# Patient Record
Sex: FEMALE | Race: WHITE | HISPANIC OR LATINO
[De-identification: names, ages, dates, MRNs, and addresses within clinical notes are randomized per-mention and may not be internally consistent; named-entity substitution may affect disease eponyms.]

---

## 2018-07-30 ENCOUNTER — APPOINTMENT (OUTPATIENT)
Dept: FAMILY MEDICINE | Facility: CLINIC | Age: 18
End: 2018-07-30
Payer: COMMERCIAL

## 2018-07-30 ENCOUNTER — LABORATORY RESULT (OUTPATIENT)
Age: 18
End: 2018-07-30

## 2018-07-30 VITALS
BODY MASS INDEX: 26.62 KG/M2 | SYSTOLIC BLOOD PRESSURE: 115 MMHG | OXYGEN SATURATION: 98 % | DIASTOLIC BLOOD PRESSURE: 60 MMHG | HEIGHT: 61 IN | HEART RATE: 71 BPM | WEIGHT: 141 LBS

## 2018-07-30 DIAGNOSIS — Z87.19 PERSONAL HISTORY OF OTHER DISEASES OF THE DIGESTIVE SYSTEM: ICD-10-CM

## 2018-07-30 DIAGNOSIS — Z90.49 ACQUIRED ABSENCE OF OTHER SPECIFIED PARTS OF DIGESTIVE TRACT: ICD-10-CM

## 2018-07-30 PROCEDURE — 36415 COLL VENOUS BLD VENIPUNCTURE: CPT

## 2018-07-30 PROCEDURE — 99385 PREV VISIT NEW AGE 18-39: CPT | Mod: 25

## 2018-08-02 NOTE — REVIEW OF SYSTEMS
[Fatigue] : fatigue [Negative] : Neurological [Fever] : no fever [Chills] : no chills [Night Sweats] : no night sweats [Chest Pain] : no chest pain [Palpitations] : no palpitations [Shortness Of Breath] : no shortness of breath [Cough] : no cough [Abdominal Pain] : no abdominal pain [Nausea] : no nausea [Constipation] : no constipation [de-identified] : see HPI

## 2018-08-02 NOTE — HISTORY OF PRESENT ILLNESS
[FreeTextEntry1] : to establish care  [de-identified] : Ms. NERY ASHRAF presents today to establish care being referred to me by insurance \par She is an affable 18 year old female with PMH significant for depression and OCD hospitalized on 2 occasions for mental health related issues;  one in High school and one in first year of college ( OD on Robitussin) .  \par PSH significant for Appendectomy/ dental work. \par \par Denies any recent ER visits/hospitalizations/ MVA's or MSK injuries. 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

## 2018-08-02 NOTE — PHYSICAL EXAM
[No Acute Distress] : no acute distress [Normal Sclera/Conjunctiva] : normal sclera/conjunctiva [PERRL] : pupils equal round and reactive to light [Normal Oropharynx] : the oropharynx was normal [Normal TMs] : both tympanic membranes were normal [Supple] : supple [Thyroid Normal, No Nodules] : the thyroid was normal and there were no nodules present [No Respiratory Distress] : no respiratory distress  [Clear to Auscultation] : lungs were clear to auscultation bilaterally [Regular Rhythm] : with a regular rhythm [Pedal Pulses Present] : the pedal pulses are present [No Edema] : there was no peripheral edema [Soft] : abdomen soft [Non Tender] : non-tender [No Masses] : no abdominal mass palpated [No Spinal Tenderness] : no spinal tenderness [No Joint Swelling] : no joint swelling [Grossly Normal Strength/Tone] : grossly normal strength/tone [No Rash] : no rash [Normal Gait] : normal gait [No Focal Deficits] : no focal deficits [Normal Affect] : the affect was normal [Alert and Oriented x3] : oriented to person, place, and time [de-identified] : calm and articulate  [de-identified] : warm and dry

## 2018-08-02 NOTE — ASSESSMENT
[FreeTextEntry1] :  Well exam for 18  year old WF with PMH as stated in HPI / active list. \par \par Management : \par  Discussed strategies for caloric reduction and regular aerobic activity.\par \par Patient counseled on health maintenance and preventive measures with a focus on avoiding C/E/D.\par Safe sex practices reviewed.\par \par See HPI and Plan\par \par Labs in office today.\par \par Best wishes offered.\par

## 2018-08-03 LAB
ALBUMIN SERPL ELPH-MCNC: 4.8 G/DL
ALP BLD-CCNC: 60 U/L
ALT SERPL-CCNC: 9 U/L
ANION GAP SERPL CALC-SCNC: 18 MMOL/L
APPEARANCE: CLEAR
AST SERPL-CCNC: 16 U/L
BASOPHILS # BLD AUTO: 0.05 K/UL
BASOPHILS NFR BLD AUTO: 0.7 %
BILIRUB SERPL-MCNC: 1 MG/DL
BILIRUBIN URINE: NEGATIVE
BLOOD URINE: ABNORMAL
BUN SERPL-MCNC: 12 MG/DL
CALCIUM SERPL-MCNC: 9.8 MG/DL
CHLORIDE SERPL-SCNC: 98 MMOL/L
CO2 SERPL-SCNC: 21 MMOL/L
COLOR: YELLOW
CREAT SERPL-MCNC: 0.66 MG/DL
EOSINOPHIL # BLD AUTO: 0.22 K/UL
EOSINOPHIL NFR BLD AUTO: 2.9 %
GLUCOSE QUALITATIVE U: NEGATIVE MG/DL
GLUCOSE SERPL-MCNC: 81 MG/DL
HBA1C MFR BLD HPLC: 4.6 %
HCT VFR BLD CALC: 40.9 %
HGB BLD-MCNC: 13.9 G/DL
IMM GRANULOCYTES NFR BLD AUTO: 0 %
KETONES URINE: NEGATIVE
LEUKOCYTE ESTERASE URINE: NEGATIVE
LYMPHOCYTES # BLD AUTO: 2.54 K/UL
LYMPHOCYTES NFR BLD AUTO: 33.1 %
MAN DIFF?: NORMAL
MCHC RBC-ENTMCNC: 33 PG
MCHC RBC-ENTMCNC: 34 GM/DL
MCV RBC AUTO: 97.1 FL
MONOCYTES # BLD AUTO: 0.33 K/UL
MONOCYTES NFR BLD AUTO: 4.3 %
NEUTROPHILS # BLD AUTO: 4.53 K/UL
NEUTROPHILS NFR BLD AUTO: 59 %
NITRITE URINE: NEGATIVE
PH URINE: 8
PLATELET # BLD AUTO: 406 K/UL
POTASSIUM SERPL-SCNC: 4.4 MMOL/L
PROT SERPL-MCNC: 7.2 G/DL
PROTEIN URINE: NEGATIVE MG/DL
RBC # BLD: 4.21 M/UL
RBC # FLD: 13.7 %
SODIUM SERPL-SCNC: 137 MMOL/L
SPECIFIC GRAVITY URINE: 1.02
TSH SERPL-ACNC: 1.19 UIU/ML
UROBILINOGEN URINE: NEGATIVE MG/DL
WBC # FLD AUTO: 7.67 K/UL

## 2018-08-13 ENCOUNTER — APPOINTMENT (OUTPATIENT)
Dept: FAMILY MEDICINE | Facility: CLINIC | Age: 18
End: 2018-08-13
Payer: COMMERCIAL

## 2018-08-13 VITALS
BODY MASS INDEX: 26.43 KG/M2 | DIASTOLIC BLOOD PRESSURE: 68 MMHG | SYSTOLIC BLOOD PRESSURE: 114 MMHG | HEART RATE: 78 BPM | HEIGHT: 61 IN | WEIGHT: 140 LBS | OXYGEN SATURATION: 98 %

## 2018-08-13 PROCEDURE — 99214 OFFICE O/P EST MOD 30 MIN: CPT

## 2018-08-15 NOTE — REVIEW OF SYSTEMS
[Hearing Loss] : hearing loss [Negative] : Genitourinary [Fever] : no fever [Night Sweats] : no night sweats

## 2018-08-15 NOTE — HISTORY OF PRESENT ILLNESS
[FreeTextEntry1] : microscopic hematuria incidentally on labs in July .  Not at time of menses. \par Is menstruating today. \par Will provide lab slip and advised sample one week after menses and no GYM one day before.  [de-identified] : Last seen to establish care in July 2018 and encounter.  Reviewed.\par \par Today presents for removal of cerumen impaction B/L after one week of Debrox.\par Reports  ear fullness.

## 2018-08-15 NOTE — PHYSICAL EXAM
[No Acute Distress] : no acute distress [Normal Sclera/Conjunctiva] : normal sclera/conjunctiva [Supple] : supple [No Respiratory Distress] : no respiratory distress  [No Accessory Muscle Use] : no accessory muscle use [Normal Rate] : normal rate  [Normal S1, S2] : normal S1 and S2 [Alert and Oriented x3] : oriented to person, place, and time [de-identified] : calm and pleasant  [de-identified] : Cerumen impaction B/L

## 2018-09-04 LAB
APPEARANCE: CLEAR
BACTERIA: NEGATIVE
BILIRUBIN URINE: NEGATIVE
BLOOD URINE: ABNORMAL
COLOR: YELLOW
GLUCOSE QUALITATIVE U: NEGATIVE MG/DL
KETONES URINE: NEGATIVE
LEUKOCYTE ESTERASE URINE: NEGATIVE
MICROSCOPIC-UA: NORMAL
NITRITE URINE: NEGATIVE
PH URINE: 8.5
PROTEIN URINE: NEGATIVE MG/DL
RED BLOOD CELLS URINE: 9 /HPF
SPECIFIC GRAVITY URINE: 1.02
SQUAMOUS EPITHELIAL CELLS: 2 /HPF
UROBILINOGEN URINE: NEGATIVE MG/DL
WHITE BLOOD CELLS URINE: 1 /HPF

## 2019-07-08 ENCOUNTER — APPOINTMENT (OUTPATIENT)
Dept: FAMILY MEDICINE | Facility: CLINIC | Age: 19
End: 2019-07-08

## 2019-07-16 ENCOUNTER — APPOINTMENT (OUTPATIENT)
Dept: FAMILY MEDICINE | Facility: CLINIC | Age: 19
End: 2019-07-16

## 2019-07-16 ENCOUNTER — APPOINTMENT (OUTPATIENT)
Dept: FAMILY MEDICINE | Facility: CLINIC | Age: 19
End: 2019-07-16
Payer: COMMERCIAL

## 2019-07-16 VITALS
HEART RATE: 113 BPM | WEIGHT: 122 LBS | BODY MASS INDEX: 23.03 KG/M2 | HEIGHT: 61 IN | DIASTOLIC BLOOD PRESSURE: 60 MMHG | SYSTOLIC BLOOD PRESSURE: 100 MMHG | TEMPERATURE: 98.6 F | RESPIRATION RATE: 16 BRPM | OXYGEN SATURATION: 98 %

## 2019-07-16 DIAGNOSIS — Z23 ENCOUNTER FOR IMMUNIZATION: ICD-10-CM

## 2019-07-16 DIAGNOSIS — F51.02 ADJUSTMENT INSOMNIA: ICD-10-CM

## 2019-07-16 DIAGNOSIS — R31.29 OTHER MICROSCOPIC HEMATURIA: ICD-10-CM

## 2019-07-16 DIAGNOSIS — H61.23 IMPACTED CERUMEN, BILATERAL: ICD-10-CM

## 2019-07-16 PROCEDURE — 99214 OFFICE O/P EST MOD 30 MIN: CPT

## 2019-07-16 RX ORDER — PROPRANOLOL HYDROCHLORIDE 10 MG/1
10 TABLET ORAL
Refills: 0 | Status: DISCONTINUED | COMMUNITY
End: 2019-07-16

## 2019-07-21 PROBLEM — H61.23 BILATERAL IMPACTED CERUMEN: Status: RESOLVED | Noted: 2018-08-13 | Resolved: 2019-07-21

## 2019-07-21 PROBLEM — F51.02 TRANSIENT INSOMNIA: Status: ACTIVE | Noted: 2019-07-21

## 2019-07-21 PROBLEM — R31.29 MICROSCOPIC HEMATURIA: Status: RESOLVED | Noted: 2018-08-13 | Resolved: 2019-07-21

## 2019-07-21 NOTE — ASSESSMENT
[FreeTextEntry1] : acute on chronic anxiety    advised urgent contact with Psychiatrist who is not available today.\par much support rendered .\par In consultation with mother ( who was in waiting room) agrees to SHORT TERM use of Xanax to help with sleep as well and acute emotion.  \par Call  office tomorrow.\par i will be available for consult  with Psychiatrist .

## 2019-07-21 NOTE — PHYSICAL EXAM
[No Respiratory Distress] : no respiratory distress  [No Accessory Muscle Use] : no accessory muscle use [Normal Rate] : normal rate  [Regular Rhythm] : with a regular rhythm [No Joint Swelling] : no joint swelling [No Focal Deficits] : no focal deficits [de-identified] : tearful  pam

## 2019-07-21 NOTE — HEALTH RISK ASSESSMENT
[Yes] : Yes [2 - 3 times a week (3 pts)] : 2 - 3  times a week (3 points) [1 or 2 (0 pts)] : 1 or 2 (0 points) [Never (0 pts)] : Never (0 points) [3] : 2) Feeling down, depressed, or hopeless for nearly every day (3) [] : No [Audit-CScore] : 3

## 2019-07-21 NOTE — HISTORY OF PRESENT ILLNESS
[FreeTextEntry8] : Pt c/o not being able to sleep 3 weeks. \par Pt c/o of Anxiety for the past week \par [facility] \par \par Last seen August 2018 and encounter reviewed.\par Since has been well ; attends college and doing well; Regular FU with Psych for chronic anxiety and depression. \par Noticeable weight loss of  18 lbs. She states "diet". \par \par States she is unable to sleep for "days" and is concerned  for "everything.".

## 2019-07-29 ENCOUNTER — APPOINTMENT (OUTPATIENT)
Dept: FAMILY MEDICINE | Facility: CLINIC | Age: 19
End: 2019-07-29

## 2019-08-22 ENCOUNTER — APPOINTMENT (OUTPATIENT)
Dept: FAMILY MEDICINE | Facility: CLINIC | Age: 19
End: 2019-08-22

## 2020-06-30 ENCOUNTER — APPOINTMENT (OUTPATIENT)
Dept: FAMILY MEDICINE | Facility: CLINIC | Age: 20
End: 2020-06-30

## 2020-07-02 ENCOUNTER — APPOINTMENT (OUTPATIENT)
Dept: DERMATOLOGY | Facility: CLINIC | Age: 20
End: 2020-07-02
Payer: COMMERCIAL

## 2020-07-02 PROCEDURE — 99202 OFFICE O/P NEW SF 15 MIN: CPT

## 2020-10-08 ENCOUNTER — APPOINTMENT (OUTPATIENT)
Dept: FAMILY MEDICINE | Facility: CLINIC | Age: 20
End: 2020-10-08

## 2021-01-14 ENCOUNTER — EMERGENCY (EMERGENCY)
Facility: HOSPITAL | Age: 21
LOS: 1 days | Discharge: HOME | End: 2021-01-14
Attending: EMERGENCY MEDICINE
Payer: COMMERCIAL

## 2021-01-14 VITALS
DIASTOLIC BLOOD PRESSURE: 74 MMHG | HEART RATE: 66 BPM | OXYGEN SATURATION: 99 % | TEMPERATURE: 98 F | RESPIRATION RATE: 18 BRPM | SYSTOLIC BLOOD PRESSURE: 123 MMHG

## 2021-01-14 VITALS
RESPIRATION RATE: 18 BRPM | DIASTOLIC BLOOD PRESSURE: 73 MMHG | SYSTOLIC BLOOD PRESSURE: 114 MMHG | TEMPERATURE: 98 F | OXYGEN SATURATION: 98 % | HEART RATE: 87 BPM

## 2021-01-14 DIAGNOSIS — F60.3 BORDERLINE PERSONALITY DISORDER: ICD-10-CM

## 2021-01-14 DIAGNOSIS — Z98.89 OTHER SPECIFIED POSTPROCEDURAL STATES: Chronic | ICD-10-CM

## 2021-01-14 DIAGNOSIS — F31.81 BIPOLAR II DISORDER: ICD-10-CM

## 2021-01-14 LAB
ALBUMIN SERPL ELPH-MCNC: 5 G/DL — SIGNIFICANT CHANGE UP (ref 3.3–5.2)
ALP SERPL-CCNC: 61 U/L — SIGNIFICANT CHANGE UP (ref 40–120)
ALT FLD-CCNC: 15 U/L — SIGNIFICANT CHANGE UP
AMPHET UR-MCNC: NEGATIVE — SIGNIFICANT CHANGE UP
ANION GAP SERPL CALC-SCNC: 10 MMOL/L — SIGNIFICANT CHANGE UP (ref 5–17)
APAP SERPL-MCNC: <3 UG/ML — LOW (ref 10–26)
AST SERPL-CCNC: 17 U/L — SIGNIFICANT CHANGE UP
BARBITURATES UR SCN-MCNC: NEGATIVE — SIGNIFICANT CHANGE UP
BENZODIAZ UR-MCNC: NEGATIVE — SIGNIFICANT CHANGE UP
BILIRUB SERPL-MCNC: 0.7 MG/DL — SIGNIFICANT CHANGE UP (ref 0.4–2)
BUN SERPL-MCNC: 12 MG/DL — SIGNIFICANT CHANGE UP (ref 8–20)
CALCIUM SERPL-MCNC: 10 MG/DL — SIGNIFICANT CHANGE UP (ref 8.6–10.2)
CHLORIDE SERPL-SCNC: 100 MMOL/L — SIGNIFICANT CHANGE UP (ref 98–107)
CO2 SERPL-SCNC: 28 MMOL/L — SIGNIFICANT CHANGE UP (ref 22–29)
COCAINE METAB.OTHER UR-MCNC: NEGATIVE — SIGNIFICANT CHANGE UP
CREAT SERPL-MCNC: 0.53 MG/DL — SIGNIFICANT CHANGE UP (ref 0.5–1.3)
ETHANOL SERPL-MCNC: <10 MG/DL — HIGH (ref 0–9)
GLUCOSE SERPL-MCNC: 113 MG/DL — HIGH (ref 70–99)
HCG UR QL: NEGATIVE — SIGNIFICANT CHANGE UP
HCT VFR BLD CALC: 42.6 % — SIGNIFICANT CHANGE UP (ref 34.5–45)
HGB BLD-MCNC: 15 G/DL — SIGNIFICANT CHANGE UP (ref 11.5–15.5)
MCHC RBC-ENTMCNC: 33.4 PG — SIGNIFICANT CHANGE UP (ref 27–34)
MCHC RBC-ENTMCNC: 35.2 GM/DL — SIGNIFICANT CHANGE UP (ref 32–36)
MCV RBC AUTO: 94.9 FL — SIGNIFICANT CHANGE UP (ref 80–100)
METHADONE UR-MCNC: NEGATIVE — SIGNIFICANT CHANGE UP
OPIATES UR-MCNC: NEGATIVE — SIGNIFICANT CHANGE UP
PCP SPEC-MCNC: SIGNIFICANT CHANGE UP
PCP UR-MCNC: NEGATIVE — SIGNIFICANT CHANGE UP
PLATELET # BLD AUTO: 421 K/UL — HIGH (ref 150–400)
POTASSIUM SERPL-MCNC: 3.4 MMOL/L — LOW (ref 3.5–5.3)
POTASSIUM SERPL-SCNC: 3.4 MMOL/L — LOW (ref 3.5–5.3)
PROT SERPL-MCNC: 8.1 G/DL — SIGNIFICANT CHANGE UP (ref 6.6–8.7)
RBC # BLD: 4.49 M/UL — SIGNIFICANT CHANGE UP (ref 3.8–5.2)
RBC # FLD: 11.7 % — SIGNIFICANT CHANGE UP (ref 10.3–14.5)
SALICYLATES SERPL-MCNC: <0.6 MG/DL — LOW (ref 10–20)
SARS-COV-2 RNA SPEC QL NAA+PROBE: SIGNIFICANT CHANGE UP
SODIUM SERPL-SCNC: 138 MMOL/L — SIGNIFICANT CHANGE UP (ref 135–145)
THC UR QL: NEGATIVE — SIGNIFICANT CHANGE UP
WBC # BLD: 10.87 K/UL — HIGH (ref 3.8–10.5)
WBC # FLD AUTO: 10.87 K/UL — HIGH (ref 3.8–10.5)

## 2021-01-14 PROCEDURE — 99285 EMERGENCY DEPT VISIT HI MDM: CPT

## 2021-01-14 PROCEDURE — 36415 COLL VENOUS BLD VENIPUNCTURE: CPT

## 2021-01-14 PROCEDURE — 80307 DRUG TEST PRSMV CHEM ANLYZR: CPT

## 2021-01-14 PROCEDURE — 90792 PSYCH DIAG EVAL W/MED SRVCS: CPT

## 2021-01-14 PROCEDURE — 81025 URINE PREGNANCY TEST: CPT

## 2021-01-14 PROCEDURE — U0003: CPT

## 2021-01-14 PROCEDURE — 85027 COMPLETE CBC AUTOMATED: CPT

## 2021-01-14 PROCEDURE — U0005: CPT

## 2021-01-14 PROCEDURE — 80053 COMPREHEN METABOLIC PANEL: CPT

## 2021-01-14 PROCEDURE — 93010 ELECTROCARDIOGRAM REPORT: CPT

## 2021-01-14 PROCEDURE — 93005 ELECTROCARDIOGRAM TRACING: CPT

## 2021-01-14 PROCEDURE — 99283 EMERGENCY DEPT VISIT LOW MDM: CPT

## 2021-01-14 RX ORDER — VENLAFAXINE HCL 75 MG
300 CAPSULE, EXT RELEASE 24 HR ORAL ONCE
Refills: 0 | Status: COMPLETED | OUTPATIENT
Start: 2021-01-14 | End: 2021-01-14

## 2021-01-14 RX ORDER — BENZTROPINE MESYLATE 1 MG
1 TABLET ORAL
Qty: 0 | Refills: 0 | DISCHARGE

## 2021-01-14 RX ORDER — ARIPIPRAZOLE 15 MG/1
1 TABLET ORAL
Qty: 0 | Refills: 0 | DISCHARGE

## 2021-01-14 RX ORDER — VENLAFAXINE HCL 75 MG
300 CAPSULE, EXT RELEASE 24 HR ORAL ONCE
Refills: 0 | Status: DISCONTINUED | OUTPATIENT
Start: 2021-01-14 | End: 2021-01-14

## 2021-01-14 RX ORDER — VENLAFAXINE HCL 75 MG
300 CAPSULE, EXT RELEASE 24 HR ORAL
Qty: 0 | Refills: 0 | DISCHARGE

## 2021-01-14 RX ADMIN — Medication 300 MILLIGRAM(S): at 20:50

## 2021-01-14 NOTE — ED BEHAVIORAL HEALTH ASSESSMENT NOTE - HPI (INCLUDE ILLNESS QUALITY, SEVERITY, DURATION, TIMING, CONTEXT, MODIFYING FACTORS, ASSOCIATED SIGNS AND SYMPTOMS)
20 yos female, lives with family, college graduate, not working, PPH Bipolar 2 disorder, Borderline Personality disorder, ADHD, 2 prior psych admissions for SI and suicide attempt, one suicide attempt by hanging (rope untied) will psych admission at [facility] and another suicide gesture by trying to choke herself in 2013 with [facility] admission, h/o cutting most recent yesterday (2 small superficial abrasions to right thigh, no pmh bib mother for med refill.  Pt reports her Effexor was raised from 150 mg to 2 tabs of 150 mg but ran out and was unable to get in tough with provider until tonight when on her way to ED.  Pt c/o withdrawal from Effexor with headache and nausea.  Pt came to ED seeking assist and to get a dose as she has plan to follow up with current provider tomorrow who will rectify the issue.  Pt reports mood is OK, some minor depression since she felt distanced from a friend which upset her and resulted in her cutting (minor superficial abrasion).  Pt also asking for Strattera dose to be adjusted and was directed to out pt provider.  Denies endorses passive SI, denies plan or intent and denies need for psych admission or safety concerns.  Mo also denies safety concerns of need for psych admission.  Pt given 300 mg Effexor ER in ED prior to discharge.  No evidence of psychosis linda, or acute condition which requires psych admission.

## 2021-01-14 NOTE — ED BEHAVIORAL HEALTH ASSESSMENT NOTE - OTHER PAST PSYCHIATRIC HISTORY (INCLUDE DETAILS REGARDING ONSET, COURSE OF ILLNESS, INPATIENT/OUTPATIENT TREATMENT)
[facility] psych provider  [facility] 2019 s/p suicide attempt by hanging  [place] 2013 attempted self strangling

## 2021-01-14 NOTE — ED PROVIDER NOTE - ATTENDING CONTRIBUTION TO CARE
Pt. awake and alert. Pt. feeling depressed. Pt. was evaluated and cleared by psychiatry. I, Dr. Salinas, performed a face to face bedside interview with this patient regarding history of present illness, review of symptoms and relevant past medical, social and family history.  I completed an independent physical examination.  I have also reviewed the ACP's note(s) and discussed the plan with the ACP.

## 2021-01-14 NOTE — ED BEHAVIORAL HEALTH ASSESSMENT NOTE - SUMMARY
20 yos female, lives with family, college graduate, not working, PPH Bipolar 2 disorder, Borderline Personality disorder, ADHD, 2 prior psych admissions for SI and suicide attempt, one suicide attempt by hanging (rope untied) will psych admission at [facility] and another suicide gesture by trying to choke herself in 2013 with [facility] admission, h/o cutting most recent yesterday (2 small superficial abrasions to right thigh, no pmh bib mother for med refill.  Pt reports her Effexor was raised from 150 mg to 2 tabs of 150 mg but ran out and was unable to get in tough with provider until tonight when on her way to ED.  Pt c/o withdrawal from Effexor with headache and nausea.  Pt came to ED seeking assist and to get a dose as she has plan to follow up with current provider tomorrow who will rectify the issue.  Pt reports mood is OK, some minor depression since she felt distanced from a friend which upset her and resulted in her cutting (minor superficial abrasion).  Pt also asking for Strattera dose to be adjusted and was directed to out pt provider.  Denies endorses passive SI, denies plan or intent and denies need for psych admission or safety concerns.  Mo also denies safety concerns of need for psych admission.  Pt given 300 mg Effexor ER in ED prior to discharge.  No evidence of psychosis linda, or acute condition which requires psych admission.  Pt is not an imminent danger to self of others and is cleared psychiatrically for discharge.  Follow up tomorrow with out pt provider.

## 2021-01-14 NOTE — ED BEHAVIORAL HEALTH ASSESSMENT NOTE - DESCRIPTION
none college grad lives with family ICU Vital Signs Last 24 Hrs  T(C): 36.7 (14 Jan 2021 17:45), Max: 36.7 (14 Jan 2021 17:45)  T(F): 98.1 (14 Jan 2021 17:45), Max: 98.1 (14 Jan 2021 17:45)  HR: 66 (14 Jan 2021 17:45) (66 - 66)  BP: 123/74 (14 Jan 2021 17:45) (123/74 - 123/74)  BP(mean): --  ABP: --  ABP(mean): --  RR: 18 (14 Jan 2021 17:45) (18 - 18)  SpO2: 99% (14 Jan 2021 17:45) (99% - 99%)

## 2021-01-14 NOTE — CHART NOTE - NSCHARTNOTEFT_GEN_A_CORE
SW Note: Per psych team, pt is T&R, already has outpt psych. provider in community. SW provided pt with DASH flyer, no further SW services noted for d/c

## 2021-01-14 NOTE — ED PROVIDER NOTE - PATIENT PORTAL LINK FT
You can access the FollowMyHealth Patient Portal offered by [facility] by registering at the following website: [email or web address]. By joining [facility]’s FollowMyHealth portal, you will also be able to view your health information using other applications (apps) compatible with our system.

## 2021-01-14 NOTE — ED BEHAVIORAL HEALTH ASSESSMENT NOTE - RISK ASSESSMENT
RF past suicide attempt and SIB  PF denies SI, no safety concerns with Pt and mother, in tx Low Acute Suicide Risk

## 2021-01-14 NOTE — ED PROVIDER NOTE - OBJECTIVE STATEMENT
19 y/o F with PMH adhd and depression.  She states that she ran out of her effexor 3-4 days ago and can't get in touch with her psychiatrist.  She also feels that her strattera dose is too high.  She states she is having "little" thoughts of self harm.

## 2021-01-14 NOTE — ED BEHAVIORAL HEALTH ASSESSMENT NOTE - NSSUICPROTFACT_PSY_ALL_CORE
denies active SI/Responsibility to children, family, or others/Identifies reasons for living/Supportive social network of family or friends

## 2021-01-14 NOTE — ED ADULT NURSE NOTE - OBJECTIVE STATEMENT
Patient come to ED with her mother concerned about withdrawal form not have taken Effexor 300mg PO in last 4 days because her doctor did not renew it.  Patient reports feeling nausea and dizzy.  Patient admits she had a relapse of self injurious behavior and cut her self yesterday has two small superficial cuts on right flank.  Denies suicidal or homicidal ideations.  Denies any psychotic symptoms.  Seeking Effexor dose till she can get refill from her doctor.  Cooperative and calm.

## 2021-02-04 PROBLEM — F32.9 MAJOR DEPRESSIVE DISORDER, SINGLE EPISODE, UNSPECIFIED: Chronic | Status: ACTIVE | Noted: 2021-01-14

## 2021-02-04 PROBLEM — F90.9 ATTENTION-DEFICIT HYPERACTIVITY DISORDER, UNSPECIFIED TYPE: Chronic | Status: ACTIVE | Noted: 2021-01-14

## 2021-05-03 ENCOUNTER — LABORATORY RESULT (OUTPATIENT)
Age: 21
End: 2021-05-03

## 2021-05-03 ENCOUNTER — APPOINTMENT (OUTPATIENT)
Dept: FAMILY MEDICINE | Facility: CLINIC | Age: 21
End: 2021-05-03
Payer: COMMERCIAL

## 2021-05-03 ENCOUNTER — NON-APPOINTMENT (OUTPATIENT)
Age: 21
End: 2021-05-03

## 2021-05-03 VITALS
DIASTOLIC BLOOD PRESSURE: 74 MMHG | SYSTOLIC BLOOD PRESSURE: 122 MMHG | TEMPERATURE: 98.1 F | HEART RATE: 98 BPM | WEIGHT: 147 LBS | HEIGHT: 61 IN | BODY MASS INDEX: 27.75 KG/M2

## 2021-05-03 VITALS — DIASTOLIC BLOOD PRESSURE: 64 MMHG | SYSTOLIC BLOOD PRESSURE: 110 MMHG

## 2021-05-03 DIAGNOSIS — F41.9 ANXIETY DISORDER, UNSPECIFIED: ICD-10-CM

## 2021-05-03 PROBLEM — Z23 ENCOUNTER FOR IMMUNIZATION: Status: ACTIVE | Noted: 2021-05-03

## 2021-05-03 PROCEDURE — 93000 ELECTROCARDIOGRAM COMPLETE: CPT

## 2021-05-03 PROCEDURE — 99072 ADDL SUPL MATRL&STAF TM PHE: CPT

## 2021-05-03 PROCEDURE — 36415 COLL VENOUS BLD VENIPUNCTURE: CPT

## 2021-05-03 PROCEDURE — 99395 PREV VISIT EST AGE 18-39: CPT | Mod: 25

## 2021-05-03 RX ORDER — ATOMOXETINE 40 MG/1
40 CAPSULE ORAL
Qty: 30 | Refills: 0 | Status: COMPLETED | COMMUNITY
Start: 2021-01-05

## 2021-05-03 RX ORDER — ARIPIPRAZOLE 5 MG/1
5 TABLET ORAL
Qty: 30 | Refills: 0 | Status: COMPLETED | COMMUNITY
Start: 2020-12-01

## 2021-05-03 RX ORDER — BENZTROPINE MESYLATE 0.5 MG/1
0.5 TABLET ORAL
Qty: 30 | Refills: 0 | Status: COMPLETED | COMMUNITY
Start: 2020-12-01

## 2021-05-03 RX ORDER — LITHIUM CARBONATE 150 MG/1
150 CAPSULE ORAL
Qty: 30 | Refills: 0 | Status: ACTIVE | COMMUNITY
Start: 2021-04-22

## 2021-05-06 PROBLEM — F41.9 ACUTE ANXIETY: Status: RESOLVED | Noted: 2019-07-16 | Resolved: 2021-05-06

## 2021-05-06 NOTE — HEALTH RISK ASSESSMENT
[1] : 2) Feeling down, depressed, or hopeless for several days (1) [Good] : ~his/her~  mood as  good [No] : No [No falls in past year] : Patient reported no falls in the past year [] : No [de-identified] : PSYCH     [[id number]] : 2 [de-identified] : as in hpi  [Sexually Active] : not sexually active

## 2021-05-06 NOTE — ADDENDUM
[FreeTextEntry1] : I, [place] Quintanilla acting as a scribe for Dr. Nadia Pereira MD on 05/03/2021 at 4:05 PM.\par

## 2021-05-06 NOTE — END OF VISIT
[FreeTextEntry3] : Medical record entries made by the scribe today, were at my direction and personally dictated to them by me, Dr. Nadia Pereira on 05/03/2021. I have reviewed the chart and agree that the record accurately reflects my personal performance of the history, physical exam, assessment and plan.\par

## 2021-05-06 NOTE — HISTORY OF PRESENT ILLNESS
[FreeTextEntry1] : CPE\par Last seen for CPE in August 2018\par Last seen in office for acute visit JULY 2019 > Reviewed. \par Does follow with PSYCH  [de-identified] : Jennifer is a 21 y/o F here for CPE. \par She was admitted to the ER in January 2021 due to Effexor medication withdrawal.\par Medication list reconciled. \par  Her new psychiatrist is Dr. Cody [facility]tran at [facility]. \par She reports menstrual cycle being short, 2 or 3 days long, but claims they occur regularly. \par \par She exercises 5x a week using You-[employer] videos. She has a part time job working at [employer].\par \par In review, 2018:\par Ms. JENNIFER ASHRAF presents today to establish care being referred to me by insurance \par She is an affable 18 year old female with PMH significant for depression and OCD hospitalized on 2 occasions for mental health related issues;  one in High school and one in first year of college ( OD on Robitussin) .  \par PSH significant for Appendectomy/ dental work. \par \par Denies any recent ER visits/hospitalizations/ MVA's or MSK injuries. 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

## 2021-05-06 NOTE — PLAN
[FreeTextEntry1] : CPE for 20 year old F with PMH as stated in HPI / active list. \par \par Management : \par \par See HPI and Plan\par \par Labs in office today. Will advise.\par \par Advised to see ENT in 1 week for earwax.  Debrox drops one week prior as directed. \par \par the value and MIS  of daily walk, 30 minutes 5 x's per week,  stressed with regard  to overall health, mental health and bone density.\par \par Best wishes offered!\par

## 2021-05-11 LAB
ALBUMIN SERPL ELPH-MCNC: 4.9 G/DL
ALP BLD-CCNC: 81 U/L
ALT SERPL-CCNC: 17 U/L
ANION GAP SERPL CALC-SCNC: 12 MMOL/L
APPEARANCE: CLEAR
AST SERPL-CCNC: 18 U/L
BASOPHILS # BLD AUTO: 0.08 K/UL
BASOPHILS NFR BLD AUTO: 0.8 %
BILIRUB SERPL-MCNC: 0.4 MG/DL
BILIRUBIN URINE: NEGATIVE
BLOOD URINE: ABNORMAL
BUN SERPL-MCNC: 11 MG/DL
CALCIUM SERPL-MCNC: 9.8 MG/DL
CHLORIDE SERPL-SCNC: 101 MMOL/L
CHOLEST SERPL-MCNC: 141 MG/DL
CO2 SERPL-SCNC: 26 MMOL/L
COLOR: NORMAL
CREAT SERPL-MCNC: 0.8 MG/DL
EOSINOPHIL # BLD AUTO: 0.27 K/UL
EOSINOPHIL NFR BLD AUTO: 2.8 %
ESTIMATED AVERAGE GLUCOSE: 85 MG/DL
GLUCOSE QUALITATIVE U: NEGATIVE
GLUCOSE SERPL-MCNC: 77 MG/DL
HBA1C MFR BLD HPLC: 4.6 %
HCT VFR BLD CALC: 40.6 %
HDLC SERPL-MCNC: 65 MG/DL
HGB BLD-MCNC: 14.2 G/DL
IMM GRANULOCYTES NFR BLD AUTO: 0.2 %
KETONES URINE: NEGATIVE
LDLC SERPL CALC-MCNC: 66 MG/DL
LEUKOCYTE ESTERASE URINE: ABNORMAL
LYMPHOCYTES # BLD AUTO: 2.82 K/UL
LYMPHOCYTES NFR BLD AUTO: 29.1 %
MAN DIFF?: NORMAL
MCHC RBC-ENTMCNC: 33 PG
MCHC RBC-ENTMCNC: 35 GM/DL
MCV RBC AUTO: 94.4 FL
MONOCYTES # BLD AUTO: 0.79 K/UL
MONOCYTES NFR BLD AUTO: 8.1 %
NEUTROPHILS # BLD AUTO: 5.72 K/UL
NEUTROPHILS NFR BLD AUTO: 59 %
NITRITE URINE: NEGATIVE
NONHDLC SERPL-MCNC: 76 MG/DL
PH URINE: 6.5
PLATELET # BLD AUTO: 422 K/UL
POTASSIUM SERPL-SCNC: 4.5 MMOL/L
PROT SERPL-MCNC: 7.5 G/DL
PROTEIN URINE: NEGATIVE
RBC # BLD: 4.3 M/UL
RBC # FLD: 12.3 %
SODIUM SERPL-SCNC: 138 MMOL/L
SPECIFIC GRAVITY URINE: 1.01
TRIGL SERPL-MCNC: 47 MG/DL
TSH SERPL-ACNC: 2.73 UIU/ML
UROBILINOGEN URINE: NORMAL
WBC # FLD AUTO: 9.7 K/UL

## 2021-09-16 ENCOUNTER — APPOINTMENT (OUTPATIENT)
Dept: FAMILY MEDICINE | Facility: CLINIC | Age: 21
End: 2021-09-16
Payer: COMMERCIAL

## 2021-09-16 ENCOUNTER — TRANSCRIPTION ENCOUNTER (OUTPATIENT)
Age: 21
End: 2021-09-16

## 2021-09-16 DIAGNOSIS — L70.9 ACNE, UNSPECIFIED: ICD-10-CM

## 2021-09-16 DIAGNOSIS — F90.9 ATTENTION-DEFICIT HYPERACTIVITY DISORDER, UNSPECIFIED TYPE: ICD-10-CM

## 2021-09-16 DIAGNOSIS — K59.00 CONSTIPATION, UNSPECIFIED: ICD-10-CM

## 2021-09-16 PROCEDURE — 99214 OFFICE O/P EST MOD 30 MIN: CPT

## 2021-09-16 RX ORDER — FLUOXETINE HYDROCHLORIDE 40 MG/1
40 CAPSULE ORAL
Refills: 0 | Status: DISCONTINUED | COMMUNITY
End: 2021-09-16

## 2021-09-16 RX ORDER — ALPRAZOLAM 0.25 MG/1
0.25 TABLET ORAL 3 TIMES DAILY
Qty: 30 | Refills: 0 | Status: DISCONTINUED | COMMUNITY
Start: 2019-07-16 | End: 2021-09-16

## 2021-09-16 RX ORDER — METHYLPHENIDATE HYDROCHLORIDE 20 MG/1
20 TABLET ORAL
Qty: 60 | Refills: 0 | Status: DISCONTINUED | COMMUNITY
Start: 2021-03-25 | End: 2021-09-16

## 2021-09-16 RX ORDER — LAMOTRIGINE 100 MG/1
100 TABLET ORAL
Refills: 0 | Status: DISCONTINUED | COMMUNITY
End: 2021-09-16

## 2021-09-16 RX ORDER — METHYLPHENIDATE HYDROCHLORIDE 5 MG/1
5 TABLET ORAL
Qty: 30 | Refills: 0 | Status: DISCONTINUED | COMMUNITY
Start: 2021-02-25 | End: 2021-09-16

## 2021-09-22 NOTE — HISTORY OF PRESENT ILLNESS
[FreeTextEntry8] : Pt complains of abdominal pain and difficulty passing stool x 2 weeks\par Pt had wisdom tooth removal on 8/24/2021 and after finishing prescription ibuprofen 800 mg, patient was still experiencing pain and took a similar dosage of an NSAID. \par Since taking high dosage of NSAID patient has experienced abdominal pain, difficulty passing stool and bright blood in stool.\par Pt describes bowel movements as small and infrequent.\par \par \par PSYCH  Dr. [facility]\par

## 2021-09-22 NOTE — PHYSICAL EXAM
[No Acute Distress] : no acute distress [Normal Sclera/Conjunctiva] : normal sclera/conjunctiva [No JVD] : no jugular venous distention [No Respiratory Distress] : no respiratory distress  [No Accessory Muscle Use] : no accessory muscle use [Regular Rhythm] : with a regular rhythm [Normal S1, S2] : normal S1 and S2 [Soft] : abdomen soft [Non Tender] : non-tender [Non-distended] : non-distended [Normal Bowel Sounds] : normal bowel sounds [No Rash] : no rash [No Focal Deficits] : no focal deficits [Normal Gait] : normal gait [de-identified] : calm  [de-identified] : OMM MASK  [de-identified] : warm and dry  mild acne with some hypopigmentation of chin/jaw

## 2021-09-22 NOTE — ASSESSMENT
[FreeTextEntry1] : Bi Polar DO/ADHD  follow with psych  medications reconciled.\par \par Constipation   advised Colace and MiraLAX daily until soft complete BM then daily Colace for a while and QOD MiraLAX.\par \par Increase fiber and free water to 80 ounces daily.  \par \par acne topical refill provided.

## 2022-01-04 ENCOUNTER — TRANSCRIPTION ENCOUNTER (OUTPATIENT)
Age: 22
End: 2022-01-04

## 2022-01-12 ENCOUNTER — APPOINTMENT (OUTPATIENT)
Dept: FAMILY MEDICINE | Facility: CLINIC | Age: 22
End: 2022-01-12
Payer: COMMERCIAL

## 2022-01-12 ENCOUNTER — TRANSCRIPTION ENCOUNTER (OUTPATIENT)
Age: 22
End: 2022-01-12

## 2022-01-12 ENCOUNTER — NON-APPOINTMENT (OUTPATIENT)
Age: 22
End: 2022-01-12

## 2022-01-12 DIAGNOSIS — U07.1 COVID-19: ICD-10-CM

## 2022-01-12 PROCEDURE — 99214 OFFICE O/P EST MOD 30 MIN: CPT | Mod: 95

## 2022-01-18 NOTE — ASSESSMENT
[FreeTextEntry1] : NERY ASHRAF is a 21 year old female patient presenting to the clinic today for cough.\par \par #Reviewed Patients Symptoms \par - dry cough\par - trouble breathing out of nose \par \par #Covid-19\par - positive on 1/4

## 2022-01-18 NOTE — END OF VISIT
[FreeTextEntry3] : This note was written by Lori Sweeney on 01/12/2022, acting as a scribe for MD Usha.\par \par All medic record entries were at my, Dr.Meenu Amber MD, direction and personally dictated by me in 01/12/2022. I have personally reviewed the chart and agree that the record accurately reflects my personal performance of the history, physical exam, assessment, and plan.

## 2022-01-18 NOTE — HISTORY OF PRESENT ILLNESS
[Home] : at home, [unfilled] , at the time of the visit. [Medical Office: ([facility])___] : at the medical office located in  [Verbal consent obtained from patient] : the patient, [unfilled] [FreeTextEntry8] : NERY ASHRAF is a 21 year old female patient presenting to the clinic today for cough. Patient tested positive 1/4/22 for Covid.  She is still experiencing a dry cough and is unable to breath out of her nose . She starts coughing early in the morning, approximately 3 am.  \par \par She is concerned if she should go back to work since it has been 10 days since her symptom onset. She will need a note to take off work Friday 1/14.\par \par Start: 12:54 pm\par End: 1:02 pm

## 2022-01-18 NOTE — PLAN
[FreeTextEntry1] : # Start Rx prednisone 20 MG\par # Start Rx Azithromycin 250 MG; Take 2 tablets on first day then 1 tablet daily for four days \par # advised to take off work Friday 1/14 and return 1/18\par # advised to wear a mask when returning to work\par # advised to set up patient portal\par # advised to use Flonase spray with head positioned forward \par # f/u if symptoms worsen

## 2022-01-27 ENCOUNTER — APPOINTMENT (OUTPATIENT)
Dept: DERMATOLOGY | Facility: CLINIC | Age: 22
End: 2022-01-27
Payer: COMMERCIAL

## 2022-01-27 PROCEDURE — 99213 OFFICE O/P EST LOW 20 MIN: CPT

## 2022-03-24 ENCOUNTER — APPOINTMENT (OUTPATIENT)
Dept: DERMATOLOGY | Facility: CLINIC | Age: 22
End: 2022-03-24
Payer: COMMERCIAL

## 2022-03-24 PROCEDURE — 99213 OFFICE O/P EST LOW 20 MIN: CPT

## 2022-03-31 ENCOUNTER — APPOINTMENT (OUTPATIENT)
Dept: FAMILY MEDICINE | Facility: CLINIC | Age: 22
End: 2022-03-31
Payer: COMMERCIAL

## 2022-03-31 VITALS
DIASTOLIC BLOOD PRESSURE: 60 MMHG | SYSTOLIC BLOOD PRESSURE: 106 MMHG | BODY MASS INDEX: 27.38 KG/M2 | HEART RATE: 76 BPM | WEIGHT: 145 LBS | OXYGEN SATURATION: 98 % | HEIGHT: 61 IN

## 2022-03-31 DIAGNOSIS — M54.2 CERVICALGIA: ICD-10-CM

## 2022-03-31 PROCEDURE — 99214 OFFICE O/P EST MOD 30 MIN: CPT

## 2022-04-09 NOTE — PHYSICAL EXAM
[No Acute Distress] : no acute distress [Normal Sclera/Conjunctiva] : normal sclera/conjunctiva [No JVD] : no jugular venous distention [No Respiratory Distress] : no respiratory distress  [Normal Rate] : normal rate  [No Edema] : there was no peripheral edema [No Focal Deficits] : no focal deficits [Speech Grossly Normal] : speech grossly normal [Alert and Oriented x3] : oriented to person, place, and time [Supple] : supple [Thyroid Normal, No Nodules] : the thyroid was normal and there were no nodules present [de-identified] : calm and engaging  [de-identified] : FROM  winces on turning to right   no spasming   appreciated.

## 2022-04-09 NOTE — ASSESSMENT
[FreeTextEntry1] : Acute encounter for 21 year old WF with PMH as stated in HPI / active list. \par \par Management : \par \par See HPI and Plan.\par \par Prescription strength Ibuprofen 3x a day for 3 days, cyclobenzaprine in PM, follow up as needed. \par \par Gentle neck exercises for stretching advised.\par \par Ice alternating with heat for additional relief.\par \par +/- Imaging and PT if does not resolve \par \par Best wishes offered!

## 2022-04-09 NOTE — ADDENDUM
[FreeTextEntry1] : Medical record entries made by the scribe today, were at my direction and personally dictated to them by me, Dr. Nadia Pereira on 03/31/2022.  I have reviewed the chart and agree that the record accurately reflects my personal performance of the history, physical exam, assessment and plan.\par \par Brian HUTCHINSON acting as scribe for Dr. Nadia Pereira MD on 03/31/2022 at 4:16 PM.\par

## 2022-04-09 NOTE — HISTORY OF PRESENT ILLNESS
[FreeTextEntry8] : NERY ASHRAF is a 21 year old F who presents today for acute neck pain onset Saturday.  Pt states that pain in her neck occurred after going back to the gym, pain radiates to her back, "weird numbness on back sometimes."  \par \par Has self treated with Ibuprofen.  No numbness in upper extremities or hands.  \par \par Strained and uncomfortable.  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

## 2022-05-17 ENCOUNTER — APPOINTMENT (OUTPATIENT)
Dept: FAMILY MEDICINE | Facility: CLINIC | Age: 22
End: 2022-05-17
Payer: COMMERCIAL

## 2022-05-17 VITALS
WEIGHT: 140.06 LBS | OXYGEN SATURATION: 98 % | HEIGHT: 61 IN | RESPIRATION RATE: 14 BRPM | DIASTOLIC BLOOD PRESSURE: 70 MMHG | TEMPERATURE: 98.2 F | HEART RATE: 82 BPM | BODY MASS INDEX: 26.44 KG/M2 | SYSTOLIC BLOOD PRESSURE: 110 MMHG

## 2022-05-17 PROCEDURE — 99214 OFFICE O/P EST MOD 30 MIN: CPT

## 2022-05-17 RX ORDER — IBUPROFEN 600 MG/1
600 TABLET, FILM COATED ORAL 3 TIMES DAILY
Qty: 1 | Refills: 0 | Status: DISCONTINUED | COMMUNITY
Start: 2022-03-31 | End: 2022-05-17

## 2022-05-17 RX ORDER — VENLAFAXINE HYDROCHLORIDE 150 MG/1
150 CAPSULE, EXTENDED RELEASE ORAL
Qty: 30 | Refills: 0 | Status: DISCONTINUED | COMMUNITY
Start: 2020-12-01 | End: 2022-05-17

## 2022-05-17 RX ORDER — AZITHROMYCIN 250 MG/1
250 TABLET, FILM COATED ORAL
Qty: 6 | Refills: 0 | Status: DISCONTINUED | COMMUNITY
Start: 2022-01-12 | End: 2022-05-17

## 2022-05-17 RX ORDER — PREDNISONE 20 MG/1
20 TABLET ORAL DAILY
Qty: 6 | Refills: 0 | Status: DISCONTINUED | COMMUNITY
Start: 2022-01-12 | End: 2022-05-17

## 2022-05-21 ENCOUNTER — APPOINTMENT (OUTPATIENT)
Dept: RADIOLOGY | Facility: CLINIC | Age: 22
End: 2022-05-21
Payer: COMMERCIAL

## 2022-05-21 ENCOUNTER — OUTPATIENT (OUTPATIENT)
Dept: OUTPATIENT SERVICES | Facility: HOSPITAL | Age: 22
LOS: 1 days | End: 2022-05-21
Payer: COMMERCIAL

## 2022-05-21 DIAGNOSIS — M54.2 CERVICALGIA: ICD-10-CM

## 2022-05-21 DIAGNOSIS — Z98.89 OTHER SPECIFIED POSTPROCEDURAL STATES: Chronic | ICD-10-CM

## 2022-05-21 PROCEDURE — 72040 X-RAY EXAM NECK SPINE 2-3 VW: CPT | Mod: 26

## 2022-05-21 PROCEDURE — 72040 X-RAY EXAM NECK SPINE 2-3 VW: CPT

## 2022-05-21 NOTE — REVIEW OF SYSTEMS
[Fatigue] : fatigue [Headache] : headache [Negative] : Integumentary [de-identified] : "maybe a bit"

## 2022-05-21 NOTE — ASSESSMENT
[FreeTextEntry1] : At this time will offer muscle  relaxer bid  advised of sedation SE.\par Advised to goggle stretching exercises for lower back. When recovered should engage in core strengthening \par exercises. \par Would consider PT if not resolving.

## 2022-05-21 NOTE — PHYSICAL EXAM
[No Acute Distress] : no acute distress [Well-Appearing] : well-appearing [Normal Sclera/Conjunctiva] : normal sclera/conjunctiva [No Accessory Muscle Use] : no accessory muscle use [Clear to Auscultation] : lungs were clear to auscultation bilaterally [Regular Rhythm] : with a regular rhythm [Normal S1, S2] : normal S1 and S2 [No Rash] : no rash [Alert and Oriented x3] : oriented to person, place, and time [Normal Insight/Judgement] : insight and judgment were intact [de-identified] : calm and  engaging  [de-identified] : NINA [de-identified] : FROM    [de-identified] : Pain on flexion at 90  some spasming apprecited  [de-identified] : warm and dry

## 2022-05-21 NOTE — HISTORY OF PRESENT ILLNESS
[FreeTextEntry8] : NERY is being seen for  moderate , constant back pain that extends towards the shoulder and neck area for the past several week.\par  Pt has experienced numbness and tingling sensations in these areas. \par \par Seen for neck pain in March reportedly due to injury at gym. Improved "but still some pain". \par \par NERY states that her lower backs feels tight and sometimes wakes her from sleep. \par "NOthing helps "Endorses she tried ibuprofen and heat. \par "not sure " how it happened.\par NO ALARM FEATURES\par \par Follows with Behavioral [facility] vis telehealth; Dr Cody Beltran   last eval. 2 months ago  Meds stable. \par \par

## 2022-05-26 ENCOUNTER — APPOINTMENT (OUTPATIENT)
Dept: DERMATOLOGY | Facility: CLINIC | Age: 22
End: 2022-05-26
Payer: COMMERCIAL

## 2022-05-26 PROCEDURE — 99214 OFFICE O/P EST MOD 30 MIN: CPT

## 2022-06-13 ENCOUNTER — APPOINTMENT (OUTPATIENT)
Dept: ORTHOPEDIC SURGERY | Facility: CLINIC | Age: 22
End: 2022-06-13
Payer: COMMERCIAL

## 2022-06-13 VITALS — HEIGHT: 61 IN | WEIGHT: 136 LBS | BODY MASS INDEX: 25.68 KG/M2

## 2022-06-13 PROCEDURE — 99203 OFFICE O/P NEW LOW 30 MIN: CPT

## 2022-06-13 PROCEDURE — 72070 X-RAY EXAM THORAC SPINE 2VWS: CPT

## 2022-06-13 PROCEDURE — 72110 X-RAY EXAM L-2 SPINE 4/>VWS: CPT

## 2022-06-14 NOTE — ASSESSMENT
[FreeTextEntry1] : 23 y/o female patient with axial  lumbar pain.  Xrays normal.  I advised the patient to start home exercises to relieve pain. I advised the patient to take steroids in combination with home exercising. Education of the effects of steroids, with possible reaction of hyperactivity sensation.  Patient does have a hx for bipolar disorder and takes Lithium, so we've decided that the short steroid course would likely have a better side effect profile that NSAIDs with regard to nephrotoxicity.  PRN f/u.

## 2022-06-14 NOTE — HISTORY OF PRESENT ILLNESS
[Gradual] : gradual [7] : 7 [5] : 5 [Localized] : localized [Intermittent] : intermittent [Household chores] : household chores [Work] : work [Sleep] : sleep [Nothing helps with pain getting better] : Nothing helps with pain getting better [Sitting] : sitting [Lying in bed] : lying in bed [de-identified] : 23 y/o female with back pain. Pain sometimes travels upwards. Parasthenia in the back region. Has previously went to PCP for the pain. An X-Ray of cervical region was performed.  But the region of interest, thoracic, lumbar has not been evaluated.   Has taken cyclobenzaprine for back pain, but pain did not subside. Pain level varies without activity throughout the day.  No hx for radicular pains, paraesthesias, weakness, bowel or bladder complaints.  [] : no [FreeTextEntry4] : 1-2 months [FreeTextEntry5] : onset, no known injury.

## 2022-06-14 NOTE — REVIEW OF SYSTEMS
[Arthralgia] : arthralgia [Joint Pain] : joint pain [Joint Stiffness] : joint stiffness [Negative] : Heme/Lymph [FreeTextEntry9] : lumbar

## 2022-06-14 NOTE — PHYSICAL EXAM
[NL (90)] : forward flexion 90 degrees [NL (30)] : right lateral rotation 30 degrees [NL (45)] : extension 45 degrees [NL (40)] : right lateral bending 40 degrees [5___] : right plantor flexors 5[unfilled]/5 [] : non-antalgic

## 2022-06-28 ENCOUNTER — APPOINTMENT (OUTPATIENT)
Dept: ORTHOPEDIC SURGERY | Facility: CLINIC | Age: 22
End: 2022-06-28

## 2022-09-14 ENCOUNTER — APPOINTMENT (OUTPATIENT)
Dept: ORTHOPEDIC SURGERY | Facility: CLINIC | Age: 22
End: 2022-09-14

## 2022-09-14 VITALS — HEIGHT: 61 IN | WEIGHT: 136 LBS | BODY MASS INDEX: 25.68 KG/M2

## 2022-09-14 PROCEDURE — 99214 OFFICE O/P EST MOD 30 MIN: CPT

## 2022-09-14 PROCEDURE — 99072 ADDL SUPL MATRL&STAF TM PHE: CPT

## 2022-09-14 NOTE — ASSESSMENT
[FreeTextEntry1] : 21 y/o female with low back pain. PT was interrupted after 2 weeks bc of loss of insurance.  She continued to do the home exercises every day though.  Medrol pack was NOT useful at reducing pain.  Patient was provided with a referral for continued lumbar physical therapy to work on stretching, strengthening and range of motion. Patient was provided with a lumbar home exercise program. I am requesting a lumbar spine MRI to evaluate for disc herniation. I would like to follow up with the patient in 3 weeks to assess her response to conservative care and MRI review.\par \par Prior to appointment and during encounter with patient extensive medical records were reviewed including but not limited to, hospital records, out patient records, imaging results, and lab data. During this appointment the patient was examined, diagnoses were discussed and explained in a face to face manner. In addition extensive time was spent reviewing aforementioned diagnostic studies. Counseling including abnormal image results, differential diagnoses, treatment options, risk and benefits, lifestyle changes, current condition, and current medications was performed. Patient's comments, questions, and concerns were address and patient verbalized understanding. Based on this patient's presentation at our office, which is an orthopedic spine surgeon's office, this patient inherently / intrinsically has a risk, however minute, of developing issues such as Cauda equina syndrome, bowel and bladder changes, or progression of motor or neurological deficits such as paralysis which may be permanent.\par \par I, Enrique Hardin, attest that this documentation has been prepared under the direction and in the presence of provider Jalen Linares MD.

## 2022-09-14 NOTE — PHYSICAL EXAM
[NL (90)] : forward flexion 90 degrees [NL (30)] : right lateral rotation 30 degrees [NL (45)] : extension 45 degrees [NL (40)] : right lateral bending 40 degrees [5___] : right extensor hallicus longus 5[unfilled]/5 [de-identified] : Constitutional:\par -  General Appearance: \par Unremarkable\par Body Habitus\par Well Developed \par Well Nourished\par Body Habitus\par No Deformities\par Well Groomed\par \par Ability To communicate:\par Normal\par \par Neurologic: \par Global sensation is intact to upper and lower extremities.  See examination of Neck and/or Spine for exceptions.\par Orientation to Time, Place and Person is: Normal\par Mood And Affect is Normal\par \par Skin:\par -  Head/Face, Right Upper/Lower Extremity, Left Upper/Lower Extremity: Normal\par See Examination of Neck and/or Spine for exceptions\par \par Cardiovascular:\par Peripheral Cardiovascular System is Normal\par \par Palpation of Lymph Nodes:\par Normal Palpation of lymph nodes in: Axilla, Cervical, Inguinal\par Abnormal Palpation of lymph nodes in: None  [] : non-antalgic [de-identified] : extension 20 degrees Unstable angina

## 2022-09-14 NOTE — HISTORY OF PRESENT ILLNESS
[5] : 5 [6] : 6 [Part time] : Work status: part time [de-identified] : 21 y/o female presents for re-evaluation of lumbar pain. Patient reports that she was unable to continue PT as she found it cost prohibitive. Patient found PT helpful in reducing her pain. Patient reports that she gave her self a concussion after hitting her temple against the side of her night stand, she didn't seek treatment, again secondary to cost prohibition. Patient has continued the exercises we provided her in the absence of PT, reports that she completed MDP with no relief.  [FreeTextEntry5] : pain has gotten worse after banging head towards the ending of july on night stand [de-identified] : p/t (only did 2 weeks worth of therapy)

## 2022-09-15 ENCOUNTER — APPOINTMENT (OUTPATIENT)
Dept: DERMATOLOGY | Facility: CLINIC | Age: 22
End: 2022-09-15

## 2022-09-15 PROCEDURE — 99072 ADDL SUPL MATRL&STAF TM PHE: CPT

## 2022-09-15 PROCEDURE — 99213 OFFICE O/P EST LOW 20 MIN: CPT

## 2022-10-01 ENCOUNTER — APPOINTMENT (OUTPATIENT)
Dept: MRI IMAGING | Facility: CLINIC | Age: 22
End: 2022-10-01

## 2022-10-21 ENCOUNTER — FORM ENCOUNTER (OUTPATIENT)
Age: 22
End: 2022-10-21

## 2022-10-22 ENCOUNTER — APPOINTMENT (OUTPATIENT)
Dept: MRI IMAGING | Facility: CLINIC | Age: 22
End: 2022-10-22

## 2022-10-22 PROCEDURE — 99072 ADDL SUPL MATRL&STAF TM PHE: CPT

## 2022-10-22 PROCEDURE — 72148 MRI LUMBAR SPINE W/O DYE: CPT

## 2022-11-09 ENCOUNTER — APPOINTMENT (OUTPATIENT)
Dept: ORTHOPEDIC SURGERY | Facility: CLINIC | Age: 22
End: 2022-11-09

## 2022-11-09 PROCEDURE — 99214 OFFICE O/P EST MOD 30 MIN: CPT

## 2022-11-09 PROCEDURE — 99072 ADDL SUPL MATRL&STAF TM PHE: CPT

## 2022-11-14 NOTE — PHYSICAL EXAM
[NL (90)] : forward flexion 90 degrees [NL (30)] : right lateral rotation 30 degrees [NL (45)] : extension 45 degrees [NL (40)] : right lateral bending 40 degrees [Bending to left] : bending to left [5___] : right extensor hallicus longus 5[unfilled]/5 [de-identified] : Constitutional:\par -  General Appearance: \par Unremarkable\par Body Habitus\par Well Developed \par Well Nourished\par Body Habitus\par No Deformities\par Well Groomed\par \par Ability To communicate:\par Normal\par \par Neurologic: \par Global sensation is intact to upper and lower extremities.  See examination of Neck and/or Spine for exceptions.\par Orientation to Time, Place and Person is: Normal\par Mood And Affect is Normal\par \par Skin:\par -  Head/Face, Right Upper/Lower Extremity, Left Upper/Lower Extremity: Normal\par See Examination of Neck and/or Spine for exceptions\par \par Cardiovascular:\par Peripheral Cardiovascular System is Normal\par \par Palpation of Lymph Nodes:\par Normal Palpation of lymph nodes in: Axilla, Cervical, Inguinal\par Abnormal Palpation of lymph nodes in: None  [] : non-antalgic [de-identified] : extension 20 degrees

## 2022-11-14 NOTE — ASSESSMENT
[FreeTextEntry1] : 23 y/o female with mild disc degeneration. Patient will continue with PT and transition to HEP. Discussed that there is no need for injection therapy or surgical intervention at this time. Patient will follow up as needed. \par \par Prior to appointment and during encounter with patient extensive medical records were reviewed including but not limited to, hospital records, out patient records, imaging results, and lab data. During this appointment the patient was examined, diagnoses were discussed and explained in a face to face manner. In addition extensive time was spent reviewing aforementioned diagnostic studies. Counseling including abnormal image results, differential diagnoses, treatment options, risk and benefits, lifestyle changes, current condition, and current medications was performed. Patient's comments, questions, and concerns were address and patient verbalized understanding. Based on this patient's presentation at our office, which is an orthopedic spine surgeon's office, this patient inherently / intrinsically has a risk, however minute, of developing issues such as Cauda equina syndrome, bowel and bladder changes, or progression of motor or neurological deficits such as paralysis which may be permanent.\par \par I, Janessa Estrada, attest that this documentation has been prepared under the direction and in the presence of provider Jalen Linares MD.

## 2022-11-14 NOTE — HISTORY OF PRESENT ILLNESS
[5] : 5 [6] : 6 [Part time] : Work status: part time [de-identified] : 11/9/22: Patient states she is feeling slightly better since her last visit. She attended a few sessions of PT with some relief. She has been doing HEP as best tolerated. States she has radicular pain as well.\par \par Prev doc:\par 23 y/o female presents for re-evaluation of lumbar pain. Patient reports that she was unable to continue PT as she found it cost prohibitive. Patient found PT helpful in reducing her pain. Patient reports that she gave her self a concussion after hitting her temple against the side of her night stand, she didn't seek treatment, again secondary to cost prohibition. Patient has continued the exercises we provided her in the absence of PT, reports that she completed MDP with no relief.  [de-identified] : p/t (only did 2 weeks worth of therapy)

## 2022-11-14 NOTE — DATA REVIEWED
[MRI] : MRI [Lumbar Spine] : lumbar spine [Report was reviewed and noted in the chart] : The report was reviewed and noted in the chart [I independently reviewed and interpreted images and report] : I independently reviewed and interpreted images and report [I reviewed the films/CD and additionally noted] : I reviewed the films/CD and additionally noted [FreeTextEntry1] : 10/22/22 [facility] \par L4-5 mild small central HNP with no stenosis, no nerve compression

## 2022-11-28 ENCOUNTER — APPOINTMENT (OUTPATIENT)
Dept: FAMILY MEDICINE | Facility: CLINIC | Age: 22
End: 2022-11-28

## 2022-11-28 ENCOUNTER — LABORATORY RESULT (OUTPATIENT)
Age: 22
End: 2022-11-28

## 2022-11-28 ENCOUNTER — NON-APPOINTMENT (OUTPATIENT)
Age: 22
End: 2022-11-28

## 2022-11-28 VITALS
WEIGHT: 130 LBS | DIASTOLIC BLOOD PRESSURE: 70 MMHG | SYSTOLIC BLOOD PRESSURE: 122 MMHG | HEIGHT: 61 IN | OXYGEN SATURATION: 98 % | HEART RATE: 95 BPM | BODY MASS INDEX: 24.55 KG/M2

## 2022-11-28 DIAGNOSIS — F31.9 BIPOLAR DISORDER, UNSPECIFIED: ICD-10-CM

## 2022-11-28 DIAGNOSIS — F32.A DEPRESSION, UNSPECIFIED: ICD-10-CM

## 2022-11-28 DIAGNOSIS — Z00.00 ENCOUNTER FOR GENERAL ADULT MEDICAL EXAMINATION W/OUT ABNORMAL FINDINGS: ICD-10-CM

## 2022-11-28 PROCEDURE — 99072 ADDL SUPL MATRL&STAF TM PHE: CPT

## 2022-11-28 PROCEDURE — 99395 PREV VISIT EST AGE 18-39: CPT

## 2022-11-28 RX ORDER — GUANFACINE 1 MG/1
1 TABLET ORAL
Qty: 30 | Refills: 0 | Status: COMPLETED | COMMUNITY
Start: 2022-10-24

## 2022-11-28 RX ORDER — DAPSONE 50 MG/G
5 GEL TOPICAL
Qty: 60 | Refills: 0 | Status: COMPLETED | COMMUNITY
Start: 2022-05-26

## 2022-11-28 RX ORDER — DEXTROAMPHETAMINE SACCHARATE, AMPHETAMINE ASPARTATE, DEXTROAMPHETAMINE SULFATE AND AMPHETAMINE SULFATE 3.75; 3.75; 3.75; 3.75 MG/1; MG/1; MG/1; MG/1
15 TABLET ORAL
Qty: 60 | Refills: 0 | Status: COMPLETED | COMMUNITY
Start: 2022-08-08

## 2022-11-28 RX ORDER — DESVENLAFAXINE 25 MG/1
25 TABLET, EXTENDED RELEASE ORAL
Qty: 30 | Refills: 0 | Status: COMPLETED | COMMUNITY
Start: 2022-10-20

## 2022-11-28 RX ORDER — CYCLOBENZAPRINE HYDROCHLORIDE 5 MG/1
5 TABLET, FILM COATED ORAL
Qty: 30 | Refills: 0 | Status: DISCONTINUED | COMMUNITY
Start: 2022-03-31 | End: 2022-11-28

## 2022-11-28 RX ORDER — PRAZOSIN HYDROCHLORIDE 2 MG/1
2 CAPSULE ORAL
Refills: 0 | Status: DISCONTINUED | COMMUNITY
Start: 2021-09-16 | End: 2022-11-28

## 2022-11-28 RX ORDER — DEXTROAMPHETAMINE SACCHARATE, AMPHETAMINE ASPARTATE, DEXTROAMPHETAMINE SULFATE, AND AMPHETAMINE SULFATE 5; 5; 5; 5 MG/1; MG/1; MG/1; MG/1
20 TABLET ORAL
Qty: 30 | Refills: 0 | Status: DISCONTINUED | COMMUNITY
Start: 2021-09-16 | End: 2022-11-28

## 2022-11-28 RX ORDER — SPIRONOLACTONE 50 MG/1
50 TABLET ORAL
Qty: 60 | Refills: 0 | Status: ACTIVE | COMMUNITY
Start: 2022-07-12

## 2022-11-28 RX ORDER — LORATADINE 10 MG
17 TABLET,DISINTEGRATING ORAL DAILY
Qty: 1 | Refills: 2 | Status: DISCONTINUED | COMMUNITY
Start: 2021-09-16 | End: 2022-11-28

## 2022-11-28 RX ORDER — SODIUM SULFACETAMIDE 100 MG/ML
10 LOTION TOPICAL
Qty: 118 | Refills: 0 | Status: ACTIVE | COMMUNITY
Start: 2022-07-22

## 2022-11-28 RX ORDER — CLINDAMYCIN PHOSPHATE 10 MG/ML
1 LOTION TOPICAL TWICE DAILY
Qty: 1 | Refills: 1 | Status: DISCONTINUED | COMMUNITY
Start: 2020-07-02 | End: 2022-11-28

## 2022-11-28 RX ORDER — METHYLPREDNISOLONE 4 MG/1
4 TABLET ORAL
Qty: 1 | Refills: 0 | Status: DISCONTINUED | COMMUNITY
Start: 2022-06-13 | End: 2022-11-28

## 2022-11-28 RX ORDER — TRETINOIN 1 MG/G
0.1 CREAM TOPICAL
Qty: 45 | Refills: 0 | Status: ACTIVE | COMMUNITY
Start: 2022-01-27

## 2022-11-28 RX ORDER — VORTIOXETINE 10 MG/1
10 TABLET, FILM COATED ORAL
Qty: 30 | Refills: 0 | Status: COMPLETED | COMMUNITY
Start: 2022-06-30

## 2022-11-28 RX ORDER — DOCUSATE SODIUM 100 MG/1
100 CAPSULE ORAL TWICE DAILY
Qty: 60 | Refills: 0 | Status: DISCONTINUED | COMMUNITY
Start: 2021-09-16 | End: 2022-11-28

## 2022-11-28 RX ORDER — FLUTICASONE PROPIONATE 50 UG/1
50 SPRAY, METERED NASAL
Qty: 16 | Refills: 0 | Status: ACTIVE | COMMUNITY
Start: 2021-10-20

## 2022-11-28 NOTE — PLAN
[FreeTextEntry1] : \par In review May 2021:\par CPE for 20 year old F with PMH as stated in HPI / active list. \par \par Management : \par \par See HPI and Plan\par \par Labs in office today. Will advise.\par \par Advised to see ENT in 1 week for earwax.  Debrox drops one week prior as directed. \par \par the value and MIS  of daily walk, 30 minutes 5 x's per week,  stressed with regard  to overall health, mental health and bone density.\par \par Best wishes offered!\par

## 2022-12-05 LAB
ALBUMIN SERPL ELPH-MCNC: 5.1 G/DL
ALP BLD-CCNC: 55 U/L
ALT SERPL-CCNC: 10 U/L
ANION GAP SERPL CALC-SCNC: 12 MMOL/L
APPEARANCE: CLEAR
AST SERPL-CCNC: 15 U/L
BASOPHILS # BLD AUTO: 0.1 K/UL
BASOPHILS NFR BLD AUTO: 1 %
BILIRUB SERPL-MCNC: 0.6 MG/DL
BILIRUBIN URINE: NEGATIVE
BLOOD URINE: ABNORMAL
BUN SERPL-MCNC: 13 MG/DL
CALCIUM SERPL-MCNC: 10.1 MG/DL
CHLORIDE SERPL-SCNC: 98 MMOL/L
CO2 SERPL-SCNC: 25 MMOL/L
COLOR: NORMAL
CREAT SERPL-MCNC: 0.64 MG/DL
EGFR: 126 ML/MIN/1.73M2
EOSINOPHIL # BLD AUTO: 0.1 K/UL
EOSINOPHIL NFR BLD AUTO: 1 %
ESTIMATED AVERAGE GLUCOSE: 91 MG/DL
GLUCOSE QUALITATIVE U: NEGATIVE
GLUCOSE SERPL-MCNC: 89 MG/DL
HBA1C MFR BLD HPLC: 4.8 %
HCT VFR BLD CALC: 39.7 %
HGB BLD-MCNC: 13.9 G/DL
IMM GRANULOCYTES NFR BLD AUTO: 0.3 %
KETONES URINE: NEGATIVE
LEUKOCYTE ESTERASE URINE: NEGATIVE
LYMPHOCYTES # BLD AUTO: 3.65 K/UL
LYMPHOCYTES NFR BLD AUTO: 35.9 %
MAN DIFF?: NORMAL
MCHC RBC-ENTMCNC: 33.2 PG
MCHC RBC-ENTMCNC: 35 GM/DL
MCV RBC AUTO: 94.7 FL
MONOCYTES # BLD AUTO: 0.47 K/UL
MONOCYTES NFR BLD AUTO: 4.6 %
NEUTROPHILS # BLD AUTO: 5.81 K/UL
NEUTROPHILS NFR BLD AUTO: 57.2 %
NITRITE URINE: NEGATIVE
PH URINE: 6.5
PLATELET # BLD AUTO: 373 K/UL
POTASSIUM SERPL-SCNC: 4.2 MMOL/L
PROT SERPL-MCNC: 7.6 G/DL
PROTEIN URINE: NEGATIVE
RBC # BLD: 4.19 M/UL
RBC # FLD: 12.4 %
SODIUM SERPL-SCNC: 135 MMOL/L
SPECIFIC GRAVITY URINE: 1.01
TSH SERPL-ACNC: 1.91 UIU/ML
UROBILINOGEN URINE: NORMAL
WBC # FLD AUTO: 10.16 K/UL

## 2022-12-05 NOTE — HEALTH RISK ASSESSMENT
[Good] : ~his/her~  mood as  good [No] : No [No falls in past year] : Patient reported no falls in the past year [1] : 2) Feeling down, depressed, or hopeless for several days (1) [de-identified] : PSYCH     [de-identified] : as in hpi  [CXV7Silcf] : 2 [Sexually Active] : not sexually active

## 2022-12-05 NOTE — ASSESSMENT
[FreeTextEntry1] : CPE for 22 year old WF with PMH as stated in HPI / active list. \par \par Management : \par \par See HPI and Plan.\par \par Labs in office today, will advise. Medications to be reconciled.  Lithium levels to be sent to Psych Cody Beltran.  \par \par Pt has already received the influenza vaccine. \par \par Best wishes offered!

## 2022-12-05 NOTE — HISTORY OF PRESENT ILLNESS
[FreeTextEntry1] : CPE\par Last seen in May 2022 for acute encounter, last CPE May 2021, encounters reviewed.\par Recent consults reviewed and noted for:\par Ortho (mild lumbar disc degeneration, November 2022)\par \par NERY ASHRAF is a 22 year old F who presents today for a CPE.  Pt has no complaints, has been well, and denies any recent ER visits/hospitalizations/MVA's or MSK injuries. \par \par Was following with Ortho regarding lumbar pain, states she is unable to attend PT due to facility issue, plans on engaging in PT regimen when able, does what she can at home.  Pt states pain does not wake her from sleep, but it "does impact my life a lot."  No relief from Tylenol or Ibuprofen, no muscle relaxer relief or CBD relief achieved.  States yoga caused "pain and burning" in back.  \par \par C/o spasms in upper back, shoulders, and neck, cites some paraesthesia in lower extremities, all intermittent.  \par \par Continues to follow with Psych Cody Beltran, states no changes regarding her mental health care.  \par \par Plans on taking  test, states she is hesitant about working full time.   [de-identified] : \par In review May 2021:\par CPE\par Last seen for CPE in August 2018\par Last seen in office for acute visit JULY 2019 > Reviewed. \par Does follow with PSYCH \par \par Jennifer is a 19 y/o F here for CPE. \par She was admitted to the ER in January 2021 due to Effexor medication withdrawal.\par Medication list reconciled. \par  Her new psychiatrist is Dr. Cody Beltran at [facility]. \par She reports menstrual cycle being short, 2 or 3 days long, but claims they occur regularly. \par \par She exercises 5x a week using You-tube videos. She has a part time job working at [employer].\par \par In review, 2018:\par Ms. JENNIFER ASHRAF presents today to establish care being referred to me by insurance \par She is an affable 18 year old female with PMH significant for depression and OCD hospitalized on 2 occasions for mental health related issues;  one in High school and one in first year of college ( OD on Robitussin) .  \par PSH significant for Appendectomy/ dental work. \par \par Denies any recent ER visits/hospitalizations/ MVA's or MSK injuries. 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

## 2022-12-05 NOTE — ADDENDUM
[FreeTextEntry1] : Medical record entries made by the scribe today, were at my direction and personally dictated to them by me, Dr. Nadia Pereira on 11/28/2022.  I have reviewed the chart and agree that the record accurately reflects my personal performance of the history, physical exam, assessment and plan.\par \par Brian HUTCHINSON acting as scribe for Dr. Nadia Pereira MD on 11/28/2022 at 4:44 PM.\par

## 2022-12-06 LAB — LITHIUM SERPL-SCNC: <0.2 MMOL/L

## 2023-01-04 DIAGNOSIS — M54.50 LOW BACK PAIN, UNSPECIFIED: ICD-10-CM

## 2023-04-03 ENCOUNTER — APPOINTMENT (OUTPATIENT)
Dept: DERMATOLOGY | Facility: CLINIC | Age: 23
End: 2023-04-03

## 2023-04-04 ENCOUNTER — APPOINTMENT (OUTPATIENT)
Dept: DERMATOLOGY | Facility: CLINIC | Age: 23
End: 2023-04-04
Payer: COMMERCIAL

## 2023-04-04 PROCEDURE — 99072 ADDL SUPL MATRL&STAF TM PHE: CPT

## 2023-04-04 PROCEDURE — 99213 OFFICE O/P EST LOW 20 MIN: CPT

## 2023-05-03 ENCOUNTER — APPOINTMENT (OUTPATIENT)
Dept: ORTHOPEDIC SURGERY | Facility: CLINIC | Age: 23
End: 2023-05-03
Payer: COMMERCIAL

## 2023-05-03 VITALS — BODY MASS INDEX: 24.55 KG/M2 | WEIGHT: 130 LBS | HEIGHT: 61 IN

## 2023-05-03 DIAGNOSIS — M51.36 OTHER INTERVERTEBRAL DISC DEGENERATION, LUMBAR REGION: ICD-10-CM

## 2023-05-03 PROCEDURE — 99214 OFFICE O/P EST MOD 30 MIN: CPT

## 2023-05-03 PROCEDURE — 99072 ADDL SUPL MATRL&STAF TM PHE: CPT

## 2023-05-08 PROBLEM — M51.36 DISC DEGENERATION, LUMBAR: Status: ACTIVE | Noted: 2022-11-09

## 2023-05-08 NOTE — PHYSICAL EXAM
[NL (90)] : forward flexion 90 degrees [NL (30)] : right lateral rotation 30 degrees [NL (45)] : extension 45 degrees [NL (40)] : right lateral bending 40 degrees [Bending to left] : bending to left [5___] : right extensor hallicus longus 5[unfilled]/5 [de-identified] : Constitutional:\par -  General Appearance: \par Unremarkable\par Body Habitus\par Well Developed \par Well Nourished\par Body Habitus\par No Deformities\par Well Groomed\par \par Ability To communicate:\par Normal\par \par Neurologic: \par Global sensation is intact to upper and lower extremities.  See examination of Neck and/or Spine for exceptions.\par Orientation to Time, Place and Person is: Normal\par Mood And Affect is Normal\par \par Skin:\par -  Head/Face, Right Upper/Lower Extremity, Left Upper/Lower Extremity: Normal\par See Examination of Neck and/or Spine for exceptions\par \par Cardiovascular:\par Peripheral Cardiovascular System is Normal\par \par Palpation of Lymph Nodes:\par Normal Palpation of lymph nodes in: Axilla, Cervical, Inguinal\par Abnormal Palpation of lymph nodes in: None  [] : non-antalgic [de-identified] : extension 20 degrees

## 2023-05-08 NOTE — ASSESSMENT
[FreeTextEntry1] : 23 y/o female with mild disc degeneration. Patient will continue with PT and transition to HEP. Discussed that there is no need for injection therapy or surgical intervention at this time. Patient will follow up as needed. \par \par Prior to appointment and during encounter with patient extensive medical records were reviewed including but not limited to, hospital records, out patient records, imaging results, and lab data. During this appointment the patient was examined, diagnoses were discussed and explained in a face to face manner. In addition extensive time was spent reviewing aforementioned diagnostic studies. Counseling including abnormal image results, differential diagnoses, treatment options, risk and benefits, lifestyle changes, current condition, and current medications was performed. Patient's comments, questions, and concerns were address and patient verbalized understanding. Based on this patient's presentation at our office, which is an orthopedic spine surgeon's office, this patient inherently / intrinsically has a risk, however minute, of developing issues such as Cauda equina syndrome, bowel and bladder changes, or progression of motor or neurological deficits such as paralysis which may be permanent.\par \par \Bertin Funes, personally performed the services described in this documentation incident to Jalen Lianres MD.\par

## 2023-05-08 NOTE — HISTORY OF PRESENT ILLNESS
[Lower back] : lower back [5] : 5 [Dull/Aching] : dull/aching [Sharp] : sharp [Throbbing] : throbbing [Tingling] : tingling [Intermittent] : intermittent [Physical therapy] : physical therapy [6] : 6 [Part time] : Work status: part time [de-identified] : 05/03/2023- Patient presents to the officer reevaluation of lower back pain. Patient last seen in November patient complains of lower central back pain with mild radicular component into bilateral buttocks worse with activity. Patient started physical therapy in January only completed two sessions.  Patient wishes to trial a full therapy session for physical therapy trial with 2 to 3 times a week for six weeks. Patient states HEP significantly reduces a regular pain most of her pain is in her low back. \par \par 11/9/22: Patient states she is feeling slightly better since her last visit. She attended a few sessions of PT with some relief. She has been doing HEP as best tolerated. States she has radicular pain as well.\par \par Prev doc:\par 21 y/o female presents for re-evaluation of lumbar pain. Patient reports that she was unable to continue PT as she found it cost prohibitive. Patient found PT helpful in reducing her pain. Patient reports that she gave her self a concussion after hitting her temple against the side of her night stand, she didn't seek treatment, again secondary to cost prohibition. Patient has continued the exercises we provided her in the absence of PT, reports that she completed MDP with no relief.  [] : no [FreeTextEntry9] : HEP  [de-identified] : prolonged activity  [de-identified] : p/t (only did 2 weeks worth of therapy)

## 2023-09-12 ENCOUNTER — EMERGENCY (EMERGENCY)
Facility: HOSPITAL | Age: 23
LOS: 1 days | Discharge: HOME | End: 2023-09-12
Attending: STUDENT IN AN ORGANIZED HEALTH CARE EDUCATION/TRAINING PROGRAM
Payer: COMMERCIAL

## 2023-09-12 VITALS
HEART RATE: 97 BPM | HEIGHT: 61 IN | DIASTOLIC BLOOD PRESSURE: 83 MMHG | RESPIRATION RATE: 17 BRPM | WEIGHT: 142.64 LBS | OXYGEN SATURATION: 100 % | SYSTOLIC BLOOD PRESSURE: 128 MMHG | TEMPERATURE: 99 F

## 2023-09-12 DIAGNOSIS — Z98.89 OTHER SPECIFIED POSTPROCEDURAL STATES: Chronic | ICD-10-CM

## 2023-09-12 PROCEDURE — 99282 EMERGENCY DEPT VISIT SF MDM: CPT

## 2023-09-12 PROCEDURE — 99283 EMERGENCY DEPT VISIT LOW MDM: CPT

## 2023-09-12 PROCEDURE — 99053 MED SERV 10PM-8AM 24 HR FAC: CPT

## 2023-09-12 NOTE — ED PROVIDER NOTE - NSFOLLOWUPINSTRUCTIONS_ED_ALL_ED_FT
- Benadryl 25-50mg every 6-8 hours.   - Please bring all documentation from your ED visit to any related future follow up appointment.  - Please call to schedule follow up appointment with your primary care physician within 24-48 hours.  - Please seek immediate medical attention or return to the ED for any new/worsening, signs/symptoms, or concerns. - Benadryl 25-50mg every 6-8 hours as needed.  - Please bring all documentation from your ED visit to any related future follow up appointment.  - Please call to schedule follow up appointment with your primary care physician within 24-48 hours.  - Please seek immediate medical attention or return to the ED for any new/worsening, signs/symptoms, or concerns.

## 2023-09-12 NOTE — ED PROVIDER NOTE - PHYSICAL EXAMINATION
General: In NAD, non-toxic/well-appearing.  Skin: Warm, dry, color normal for race. Perfused. No urticaria.  Head: Normocephalic/atraumatic.   Eyes: Sclera anicteric, conjunctivae clear b/l. PERRLA, EOMI.   Throat: Airway patent. Tolerating secretions, no drooling. Moist mucus membranes. Tongue midline, no swelling.  Neck: Supple, FROM.   Cardio: Rate and rhythm regular. No murmurs.   Resp: Speaking in full sentences. No visible nasal flaring, retractions, or deformity. No stridor. Breath sounds vesicular, symmetrical and without wheezing b/l.  MSK: MAEx4. FROM.   Neuro: A&Ox3. Appears nonfocal.

## 2023-09-12 NOTE — ED ADULT TRIAGE NOTE - CHIEF COMPLAINT QUOTE
Pt. c/o scalp itching, facial redness, episode of loose stool.  Took benadryl at 8:40, continues w/sxs. Pt. states rcvg allergy shot earlier today for seasonal allergies, Pt. also states taking an edible prior to the benadryl to help w/sleep.

## 2023-09-12 NOTE — ED PROVIDER NOTE - OBJECTIVE STATEMENT
22 yo female no PMHx presents to ED for evaluation. Patient reports itchy scalp, facial redness, and one episode of loose stool. Sxms began 4 hours ago. Medicated with Benadryl (unknown dosage) and Allegra 2 hours ago as well as an edible to help with sleep. Of note received allergy shot today for seasonal allergies. Has had previously without issue. No further complaints at this time.  Denies known drug allergies, difficulty breathing, tongue swelling.

## 2023-09-12 NOTE — ED PROVIDER NOTE - NS ED ATTENDING STATEMENT MOD
This was a shared visit with the GERBER. I reviewed and verified the documentation and independently performed the documented:

## 2023-09-12 NOTE — ED PROVIDER NOTE - CLINICAL SUMMARY MEDICAL DECISION MAKING FREE TEXT BOX
24 yo female no PMHx presents to ED for evaluation. Patient reports itchy scalp, facial redness, and one episode of loose stool. Sxms began 4 hours ago. Medicated with Benadryl and an edible. Normal VS, well appearing. No urticaria or overt signs of allergic reaction. No known allergies other than seasonal. Provided reassurance. Medically stable for discharge.

## 2023-09-12 NOTE — ED PROVIDER NOTE - ATTENDING APP SHARED VISIT CONTRIBUTION OF CARE
23F with scalp itchiness, facial redness, one loose bowel movement; has taken H1 blocker as well as edible ?marijuana gummy. On examination without any obvious findings, no skin rash / urticarial wheals; well appearing here, no airway involvement, will recommend continue benadryl, follow up outpt  with allergy immuno, return precautions for any worsening

## 2023-09-12 NOTE — ED PROVIDER NOTE - CARE PROVIDER_API CALL
Morgan Munson J  Allergy and Immunology  [address] [place] [address]  Phone: [phone]  Fax: [phone]  Follow Up Time:

## 2023-09-12 NOTE — ED PROVIDER NOTE - PATIENT PORTAL LINK FT
You can access the FollowMyHealth Patient Portal offered by [facility] by registering at the following website: [email or web address]. By joining [employer]’s FollowMyHealth portal, you will also be able to view your health information using other applications (apps) compatible with our system.

## 2024-04-01 ENCOUNTER — APPOINTMENT (OUTPATIENT)
Dept: DERMATOLOGY | Facility: CLINIC | Age: 24
End: 2024-04-01
Payer: COMMERCIAL

## 2024-04-01 PROCEDURE — 99214 OFFICE O/P EST MOD 30 MIN: CPT

## 2024-04-10 ENCOUNTER — APPOINTMENT (OUTPATIENT)
Dept: FAMILY MEDICINE | Facility: CLINIC | Age: 24
End: 2024-04-10
Payer: COMMERCIAL

## 2024-04-10 VITALS
SYSTOLIC BLOOD PRESSURE: 108 MMHG | BODY MASS INDEX: 27 KG/M2 | OXYGEN SATURATION: 99 % | HEART RATE: 86 BPM | HEIGHT: 61 IN | DIASTOLIC BLOOD PRESSURE: 70 MMHG | WEIGHT: 143 LBS

## 2024-04-10 DIAGNOSIS — Z02.89 ENCOUNTER FOR OTHER ADMINISTRATIVE EXAMINATIONS: ICD-10-CM

## 2024-04-10 DIAGNOSIS — Z01.00 ENCOUNTER FOR EXAMINATION OF EYES AND VISION W/OUT ABNORMAL FINDINGS: ICD-10-CM

## 2024-04-10 PROCEDURE — ZZZZZ: CPT

## 2024-04-10 NOTE — HISTORY OF PRESENT ILLNESS
[FreeTextEntry1] : Pt is following up for DMV vision test.  [de-identified] : Patient wears no corrective lenses, needs visual acuity screening.

## 2024-06-29 ENCOUNTER — NON-APPOINTMENT (OUTPATIENT)
Age: 24
End: 2024-06-29

## 2024-08-01 ENCOUNTER — APPOINTMENT (OUTPATIENT)
Dept: FAMILY MEDICINE | Facility: CLINIC | Age: 24
End: 2024-08-01

## 2024-12-01 ENCOUNTER — NON-APPOINTMENT (OUTPATIENT)
Age: 24
End: 2024-12-01

## 2024-12-18 ENCOUNTER — APPOINTMENT (OUTPATIENT)
Dept: DERMATOLOGY | Facility: CLINIC | Age: 24
End: 2024-12-18
Payer: MEDICAID

## 2024-12-18 PROCEDURE — 99213 OFFICE O/P EST LOW 20 MIN: CPT

## 2025-06-02 ENCOUNTER — APPOINTMENT (OUTPATIENT)
Dept: DERMATOLOGY | Facility: CLINIC | Age: 25
End: 2025-06-02
Payer: MEDICAID

## 2025-06-02 PROCEDURE — 99214 OFFICE O/P EST MOD 30 MIN: CPT

## 2025-09-05 ENCOUNTER — APPOINTMENT (OUTPATIENT)
Dept: DERMATOLOGY | Facility: CLINIC | Age: 25
End: 2025-09-05